# Patient Record
Sex: MALE | Race: BLACK OR AFRICAN AMERICAN | Employment: FULL TIME | URBAN - METROPOLITAN AREA
[De-identification: names, ages, dates, MRNs, and addresses within clinical notes are randomized per-mention and may not be internally consistent; named-entity substitution may affect disease eponyms.]

---

## 2022-05-17 ENCOUNTER — OFFICE VISIT (OUTPATIENT)
Dept: CARDIOLOGY CLINIC | Age: 37
End: 2022-05-17

## 2022-05-17 VITALS
DIASTOLIC BLOOD PRESSURE: 62 MMHG | HEIGHT: 67 IN | OXYGEN SATURATION: 97 % | HEART RATE: 56 BPM | WEIGHT: 125 LBS | BODY MASS INDEX: 19.62 KG/M2 | RESPIRATION RATE: 16 BRPM | SYSTOLIC BLOOD PRESSURE: 92 MMHG

## 2022-05-17 DIAGNOSIS — Z82.49 FAMILY HISTORY OF EARLY CAD: ICD-10-CM

## 2022-05-17 DIAGNOSIS — Z72.0 TOBACCO USE: ICD-10-CM

## 2022-05-17 DIAGNOSIS — R01.1 HEART MURMUR: Primary | ICD-10-CM

## 2022-05-17 PROCEDURE — 99204 OFFICE O/P NEW MOD 45 MIN: CPT | Performed by: INTERNAL MEDICINE

## 2022-05-17 PROCEDURE — 93000 ELECTROCARDIOGRAM COMPLETE: CPT | Performed by: INTERNAL MEDICINE

## 2022-05-17 NOTE — PROGRESS NOTES
[facility] Crossing: Markel [place]  [phone]    History of Present Illness:  Mr. Ludmila Alegria is a 41 yo M with a history of tobacco use, family history of early coronary artery disease, referred for cardiac evaluation. He was doing a physical for his job and they noted he had a cardiac murmur. He denies any prior cardiac history. From a symptom standpoint, he denies any exertional chest pain or shortness of breath. He does have some stress and anxiety and gets occasional palpitations that just last a few minutes. He denies any lightheadedness, dizziness or syncope. His EKG here is sinus bradycardia, heart rate of 56 and nonspecific ST-T wave abnormality. On exam, he does have a I-II/VI systolic murmur at the left sternal border. Soc hx. . Tobacco 1 pack 2 days. Fam hx. Father side CAD  Assessment and Plan:   1. Cardiac murmur. Will obtain an echocardiogram for further evaluation. Depending on the results,will decide if there is any surveillance that is needed. 2. Family history of early coronary artery disease. 3. Tobacco use. Stressed the importance of cessation. 4. Stress and anxiety. He  has no past medical history on file. All other systems negative except as above. PE  Vitals:    05/17/22 1353   BP: 92/62   Pulse: (!) 56   Resp: 16   SpO2: 97%   Weight: 125 lb (56.7 kg)   Height: 5' 7\" (1.702 m)    Body mass index is 19.58 kg/m².    General appearance - alert, well appearing, and in no distress  Mental status - affect appropriate to mood  Eyes - sclera anicteric, moist mucous membranes  Neck - supple, no JVD  Chest - clear to auscultation, no wheezes, rales or rhonchi  Heart - regular kay, regular rhythm, normal S1, S2, I-II/VI systolic murmur LUSB  Abdomen - soft, nontender, nondistended, no masses or organomegaly  Neurological -no focal deficit  Extremities - peripheral pulses normal, no pedal edema      Recent Labs:  No results found for: CHOL, [address] [facility], [address] [place] [address], [address] [place] [address] Rd, [id number], HDL, HDLP, LDL, LDLC, DLDLP, TGLX, TRIGL, TRIGP, CHHD, CHHDX  No results found for: RACHID, CREAPOC, ACREA, CREA, REFC3, REFC4  No results found for: BUN, BUNPOC, IBUN, MBUNV, BUNV  No results found for: K, KI, PLK, WBK  No results found for: HBA1C, PDP5SJZO, YSC7APHG  No results found for: HGBPOC, HGB, HGBP, HGBEXT, HGBEXT  No results found for: PLT, PLTEXT, PLTEXT    Reviewed:  No past medical history on file. Social History     Tobacco Use   Smoking Status Current Every Day Smoker   Smokeless Tobacco Never Used     Social History     Substance and Sexual Activity   Alcohol Use Yes     Allergies   Allergen Reactions    Hydrocodone Rash     Denies this as allergy    Other Medication Swelling     fruit       Current Outpatient Medications   Medication Sig    oxyCODONE-acetaminophen (PERCOCET) 5-325 mg per tablet Take 1 Tab by mouth every four (4) hours as needed for Pain. Max Daily Amount: 6 Tabs. (Patient not taking: Reported on 5/17/2022)     No current facility-administered medications for this visit.        Estefani Summers MD  [facility] [place]  [address], [address] [facility] [address]  [address]

## 2022-05-24 ENCOUNTER — ANCILLARY PROCEDURE (OUTPATIENT)
Dept: CARDIOLOGY CLINIC | Age: 37
End: 2022-05-24

## 2022-05-24 ENCOUNTER — TELEPHONE (OUTPATIENT)
Dept: CARDIOLOGY CLINIC | Age: 37
End: 2022-05-24

## 2022-05-24 VITALS — HEIGHT: 67 IN | WEIGHT: 125 LBS | BODY MASS INDEX: 19.62 KG/M2

## 2022-05-24 DIAGNOSIS — Z82.49 FAMILY HISTORY OF EARLY CAD: ICD-10-CM

## 2022-05-24 DIAGNOSIS — Z72.0 TOBACCO USE: ICD-10-CM

## 2022-05-24 DIAGNOSIS — R01.1 HEART MURMUR: ICD-10-CM

## 2022-05-24 LAB
ECHO AO ROOT DIAM: 3.2 CM
ECHO AO ROOT INDEX: 1.93 CM/M2
ECHO AV AREA PEAK VELOCITY: 2.9 CM2
ECHO AV AREA PEAK VELOCITY: 2.9 CM2
ECHO AV AREA VTI: 2.5 CM2
ECHO AV AREA/BSA VTI: 1.5 CM2/M2
ECHO AV MEAN GRADIENT: 3 MMHG
ECHO AV MEAN VELOCITY: 0.8 M/S
ECHO AV PEAK GRADIENT: 5 MMHG
ECHO AV PEAK GRADIENT: 5 MMHG
ECHO AV PEAK VELOCITY: 1.1 M/S
ECHO AV PEAK VELOCITY: 1.1 M/S
ECHO AV VTI: 19.1 CM
ECHO EST RA PRESSURE: 3 MMHG
ECHO LA DIAMETER INDEX: 2.11 CM/M2
ECHO LA DIAMETER: 3.5 CM
ECHO LA TO AORTIC ROOT RATIO: 1.09
ECHO LA VOL 2C: 118 ML (ref 18–58)
ECHO LA VOL 4C: 67 ML (ref 18–58)
ECHO LA VOL BP: 89 ML (ref 18–58)
ECHO LA VOL/BSA BIPLANE: 54 ML/M2 (ref 16–34)
ECHO LA VOLUME AREA LENGTH: 95 ML
ECHO LA VOLUME INDEX A2C: 71 ML/M2 (ref 16–34)
ECHO LA VOLUME INDEX A4C: 40 ML/M2 (ref 16–34)
ECHO LA VOLUME INDEX AREA LENGTH: 57 ML/M2 (ref 16–34)
ECHO LV E' LATERAL VELOCITY: 15 CM/S
ECHO LV E' SEPTAL VELOCITY: 13 CM/S
ECHO LV EDV A2C: 108 ML
ECHO LV EDV A4C: 115 ML
ECHO LV EDV BP: 116 ML (ref 67–155)
ECHO LV EDV INDEX A4C: 69 ML/M2
ECHO LV EDV INDEX BP: 70 ML/M2
ECHO LV EDV NDEX A2C: 65 ML/M2
ECHO LV EJECTION FRACTION A2C: 51 %
ECHO LV EJECTION FRACTION A4C: 60 %
ECHO LV EJECTION FRACTION BIPLANE: 56 % (ref 55–100)
ECHO LV ESV A2C: 53 ML
ECHO LV ESV A4C: 46 ML
ECHO LV ESV BP: 51 ML (ref 22–58)
ECHO LV ESV INDEX A2C: 32 ML/M2
ECHO LV ESV INDEX A4C: 28 ML/M2
ECHO LV ESV INDEX BP: 31 ML/M2
ECHO LV FRACTIONAL SHORTENING: 40 % (ref 28–44)
ECHO LV INTERNAL DIMENSION DIASTOLE INDEX: 3.31 CM/M2
ECHO LV INTERNAL DIMENSION DIASTOLIC: 5.5 CM (ref 4.2–5.9)
ECHO LV INTERNAL DIMENSION SYSTOLIC INDEX: 1.99 CM/M2
ECHO LV INTERNAL DIMENSION SYSTOLIC: 3.3 CM
ECHO LV IVSD: 1.1 CM (ref 0.6–1)
ECHO LV MASS 2D: 227.4 G (ref 88–224)
ECHO LV MASS INDEX 2D: 137 G/M2 (ref 49–115)
ECHO LV POSTERIOR WALL DIASTOLIC: 1 CM (ref 0.6–1)
ECHO LV RELATIVE WALL THICKNESS RATIO: 0.36
ECHO LVOT AREA: 3.8 CM2
ECHO LVOT AV VTI INDEX: 0.68
ECHO LVOT DIAM: 2.2 CM
ECHO LVOT MEAN GRADIENT: 2 MMHG
ECHO LVOT PEAK GRADIENT: 3 MMHG
ECHO LVOT PEAK VELOCITY: 0.9 M/S
ECHO LVOT STROKE VOLUME INDEX: 29.5 ML/M2
ECHO LVOT SV: 49 ML
ECHO LVOT VTI: 12.9 CM
ECHO MV A VELOCITY: 0.54 M/S
ECHO MV E DECELERATION TIME (DT): 308.7 MS
ECHO MV E VELOCITY: 1.05 M/S
ECHO MV E/A RATIO: 1.94
ECHO MV E/E' LATERAL: 7
ECHO MV E/E' RATIO (AVERAGED): 7.54
ECHO MV E/E' SEPTAL: 8.08
ECHO PV MAX VELOCITY: 1 M/S
ECHO PV PEAK GRADIENT: 4 MMHG
ECHO RA MINOR AXIS INDEX: 2.35 CM/M2
ECHO RA MINOR AXIS: 3.9 CM
ECHO RIGHT VENTRICULAR SYSTOLIC PRESSURE (RVSP): 36 MMHG
ECHO RV INTERNAL DIMENSION: 3.4 CM
ECHO TV REGURGITANT MAX VELOCITY: 2.86 M/S
ECHO TV REGURGITANT PEAK GRADIENT: 33 MMHG

## 2022-05-24 PROCEDURE — 93306 TTE W/DOPPLER COMPLETE: CPT | Performed by: INTERNAL MEDICINE

## 2022-05-24 NOTE — TELEPHONE ENCOUNTER
Two patient identifiers verified. Per MD patient called and given results. scheduled follow up. Patient verbalized understanding and denies any further questions or concerns at this time.      Future Appointments   Date Time Provider Ginny Malin   11/28/2022 10:00 AM RADHA BURRIS   11/28/2022 10:40 AM MD [facility]

## 2022-05-24 NOTE — TELEPHONE ENCOUNTER
----- Message from Clint Hernandez MD sent at 5/24/2022 10:32 AM EDT -----  Please let pt know echo showed normal LV function. But there was prolapse of one of his mitral valve leaflets and moderate leakiness (regurgitation) of this valve. This is something people are born with. No current implications, but recommend follow up and same day echo in 6 months to reassess. Probably will just need regular surveillance.  thx

## 2022-07-07 ENCOUNTER — HOSPITAL ENCOUNTER (EMERGENCY)
Age: 37
Discharge: LEFT BEFORE DISCHARGE | End: 2022-07-07
Attending: EMERGENCY MEDICINE

## 2022-07-07 VITALS
OXYGEN SATURATION: 99 % | HEART RATE: 110 BPM | RESPIRATION RATE: 16 BRPM | WEIGHT: 118.17 LBS | BODY MASS INDEX: 18.55 KG/M2 | SYSTOLIC BLOOD PRESSURE: 161 MMHG | DIASTOLIC BLOOD PRESSURE: 77 MMHG | HEIGHT: 67 IN | TEMPERATURE: 98.8 F

## 2022-07-07 PROCEDURE — 99281 EMR DPT VST MAYX REQ PHY/QHP: CPT

## 2022-07-07 PROCEDURE — 75810000275 HC EMERGENCY DEPT VISIT NO LEVEL OF CARE

## 2023-05-22 RX ORDER — OXYCODONE HYDROCHLORIDE AND ACETAMINOPHEN 5; 325 MG/1; MG/1
1 TABLET ORAL EVERY 4 HOURS PRN
COMMUNITY
Start: 2015-11-18